# Patient Record
Sex: FEMALE | Race: WHITE | NOT HISPANIC OR LATINO | URBAN - METROPOLITAN AREA
[De-identification: names, ages, dates, MRNs, and addresses within clinical notes are randomized per-mention and may not be internally consistent; named-entity substitution may affect disease eponyms.]

---

## 2024-04-09 ENCOUNTER — EMERGENCY (EMERGENCY)
Facility: HOSPITAL | Age: 19
LOS: 1 days | Discharge: HOME | End: 2024-04-09
Attending: EMERGENCY MEDICINE | Admitting: EMERGENCY MEDICINE
Payer: COMMERCIAL

## 2024-04-09 VITALS
TEMPERATURE: 99 F | OXYGEN SATURATION: 99 % | SYSTOLIC BLOOD PRESSURE: 128 MMHG | HEIGHT: 67 IN | HEART RATE: 127 BPM | WEIGHT: 130.07 LBS | DIASTOLIC BLOOD PRESSURE: 84 MMHG | RESPIRATION RATE: 18 BRPM

## 2024-04-09 DIAGNOSIS — R00.0 TACHYCARDIA, UNSPECIFIED: ICD-10-CM

## 2024-04-09 DIAGNOSIS — R22.1 LOCALIZED SWELLING, MASS AND LUMP, NECK: ICD-10-CM

## 2024-04-09 DIAGNOSIS — B27.90 INFECTIOUS MONONUCLEOSIS, UNSPECIFIED WITHOUT COMPLICATION: ICD-10-CM

## 2024-04-09 DIAGNOSIS — Z91.013 ALLERGY TO SEAFOOD: ICD-10-CM

## 2024-04-09 DIAGNOSIS — F41.9 ANXIETY DISORDER, UNSPECIFIED: ICD-10-CM

## 2024-04-09 DIAGNOSIS — Z20.822 CONTACT WITH AND (SUSPECTED) EXPOSURE TO COVID-19: ICD-10-CM

## 2024-04-09 DIAGNOSIS — R59.9 ENLARGED LYMPH NODES, UNSPECIFIED: ICD-10-CM

## 2024-04-09 DIAGNOSIS — F32.A DEPRESSION, UNSPECIFIED: ICD-10-CM

## 2024-04-09 PROCEDURE — 99053 MED SERV 10PM-8AM 24 HR FAC: CPT

## 2024-04-09 PROCEDURE — 99285 EMERGENCY DEPT VISIT HI MDM: CPT

## 2024-04-09 NOTE — ED PROVIDER NOTE - CLINICAL SUMMARY MEDICAL DECISION MAKING FREE TEXT BOX
18 non-binary history of anxiety/depression  presents with 3d of bilateral neck "bumps" that are non-tender and not associated with any other symptoms of infection such as cough, sore throat, fevers/chills, recent travel, animals in the home. Currently not seeing a PMD. Smokes THC.    PE: A&Ox3, well appearing, NAD, NCAT, regular respiratory effort, moving all four extremities equally.   Bedside sono: right posterior cervical LN ~1cm    MDM: Patient with noticeable right sided neck lump that is painless in quality and not associated with other symptoms here for eval. US shows 1cm LN. Patient offered blood work and has accepted. Will also attempt to set up patient with PCP.

## 2024-04-09 NOTE — ED ADULT TRIAGE NOTE - CHIEF COMPLAINT QUOTE
walk in pt with complaints of lump to right neck and to left neck x 10 days. Pt reports they are painless and do not feel irritated. Denies pmhx.

## 2024-04-09 NOTE — ED PROVIDER NOTE - PATIENT PORTAL LINK FT
You can access the FollowMyHealth Patient Portal offered by [facility] by registering at the following website: [email or web address]. By joining [facility]’s FollowMyHealth portal, you will also be able to view your health information using other applications (apps) compatible with our system.

## 2024-04-09 NOTE — ED PROVIDER NOTE - CARE PLAN
1 Principal Discharge DX:	Lymphadenopathy   Principal Discharge DX:	Lymphadenopathy  Secondary Diagnosis:	Infectious mononucleosis

## 2024-04-09 NOTE — ED PROVIDER NOTE - PHYSICAL EXAMINATION
PE: A&Ox3, well appearing, NAD, NCAT, regular respiratory effort, moving all four extremities equally.   Bedside sono: right posterior cervical LN ~1cm PE: A&Ox3, well appearing, mildly anxious, NCAT, regular respiratory effort, moving all four extremities equally. R sided posterior lymphadenopathy without overlying ttp/erythema.  Bedside sono: right posterior cervical LN ~1cm

## 2024-04-09 NOTE — ED PROVIDER NOTE - OBJECTIVE STATEMENT
18 non-binary history of anxiety/depression  presents with 3d of bilateral neck "bumps" that are non-tender and not associated with any other symptoms of infection such as cough, sore throat, fevers/chills, recent travel, animals in the home. Currently not seeing a PMD. Smokes THC.

## 2024-04-09 NOTE — ED PROVIDER NOTE - NSFOLLOWUPINSTRUCTIONS_ED_ALL_ED_FT
You have been found to have infectious mononucleosis, a viral illness that can cause your symptoms. Please read all handouts provided to you upon discharge. Seek immediate medical care for any new/worsening signs or symptoms.     Infectious Mononucleosis    Infectious mononucleosis is an infection that is caused by a virus. This illness is often called "mono." It can spread from person to person (is contagious). Mono is usually not serious. It often goes away in 2–4 weeks without treatment. In rare cases, the illness can be bad and last longer.    What are the causes?  This condition is caused by the Nikita–Barr virus. This virus spreads through:•Contact with a sick person's saliva or other body fluids. This can happen through:  Kissing.  Having sex.  Coughing.  Sneezing.  Sharing forks, spoons, knives (utensils), or drinking glasses with a person who is sick.  Receiving blood from a person who has mono (blood transfusion).  Receiving an organ from a person who has mono (organ transplant).    What increases the risk?  You are more likely to develop this condition if:  You are 15–24 years old.    What are the signs or symptoms?  You may get symptoms 4–6 weeks after infection. Symptoms may start slowly and happen at different times. Common symptoms include:  Sore throat.   Headache.   Being very tired (fatigued).  Pain in the muscles.  Swollen glands.   Fever.   No desire for food.  Rash.    Other symptoms include:  A liver or spleen that is larger than normal.  Feeling sick to your stomach (nauseous).  Throwing up (vomiting).  Pain in the belly (abdomen).    How is this treated?  There is no cure for this condition. Mono usually goes away on its own with time. Treatment can help relieve symptoms and may include:  Taking medicines to treat pain and fever.  Drinking plenty of fluids.  Getting a lot of rest.  Taking medicines to treat swelling (corticosteroids).    In some very bad cases, treatment may have to be given in a hospital.    Follow these instructions at home:  Medicines   Take over-the-counter and prescription medicines only as told by your doctor.  Do not take ampicillin or amoxicillin. This may cause a rash.  Do not take aspirin if you are under 18.    Activity   Rest as needed.  Do not do any of the following activities until your doctor says that they are safe for you:  Contact sports. You may need to wait at least 1 month before you play sports.  Exercise that uses a lot of energy.  Lifting heavy things.  Slowly go back to your normal activities after your fever is gone, or when your doctor says that you can. Be sure to rest when you get tired.    General instructions   Avoid kissing or sharing forks, spoons, knives, or drinking cups until your doctor says that you can.  Drink enough fluid to keep your pee (urine) pale yellow.   Do not drink alcohol.    If you have a sore throat:  Rinse your mouth (gargle) with salt water 3–4 times a day or as needed. To make salt water, dissolve ½–1 tsp (3–6 g) of salt in 1 cup (237 mL) of warm water.  Eat soft foods. Cold foods such as ice cream or ice pops can help your throat feel better.  Try sucking on hard candy.  Wash your hands often with soap and water. If you cannot use soap and water, use hand .  Keep all follow-up visits as told by your doctor. This is important.      How is this prevented?   Avoid contact with people who have mono. A person who has mono may not seem sick, but he or she can still spread the virus.  Avoid sharing forks, spoons, knives, drinking cups, or toothbrushes.  Wash your hands often with soap and water. If you cannot use soap and water, use hand .  Use the inside of your elbow to cover your mouth when you cough or sneeze.    Contact a doctor if:  Your fever is not gone after 10 days.  You have swelling by your jaw or neck (swollen lymph nodes), and the swelling does not go away after 4 weeks.  Your activity level is not back to normal after 2 months.  Your skin or the white parts of your eyes turn yellow (jaundice).    You have trouble pooping (constipation). This may mean that you:  Poop (have a bowel movement) fewer times in a week than normal.  Have a hard time pooping.  Have poop that is dry, hard, or bigger than normal.      Get help right away if:  You have very bad pain in your:  Belly.  Shoulder.  You are drooling.  You have trouble swallowing.  You have trouble breathing.  You have a stiff neck.  You have a very bad headache.  You cannot stop throwing up.  You have jerky movements that you cannot control (seizures).  You are mixed up (confused).  You have trouble with balance.  Your nose or gums start to bleed.    You have signs of not having enough water in your body (dehydration). These may include:  Weakness.  Sunken eyes.  Pale skin.  Dry mouth.  Fast breathing or heartbeat.    Summary  Infectious mononucleosis, or "mono," is an infection that is caused by a virus.  Mono is usually not serious, but some people may need to be treated for it in the hospital.  You should not play contact sports or lift heavy things until your doctor says that you can.  Wash your hands often with soap and water. If you cannot use soap and water, use hand .      This information is not intended to replace advice given to you by your health care provider. Make sure you discuss any questions you have with your health care provider.

## 2024-04-10 VITALS
HEART RATE: 100 BPM | SYSTOLIC BLOOD PRESSURE: 128 MMHG | OXYGEN SATURATION: 99 % | DIASTOLIC BLOOD PRESSURE: 80 MMHG | RESPIRATION RATE: 18 BRPM

## 2024-04-10 LAB
ALBUMIN SERPL ELPH-MCNC: 3.3 G/DL — LOW (ref 3.4–5)
ALP SERPL-CCNC: 74 U/L — SIGNIFICANT CHANGE UP (ref 40–120)
ALT FLD-CCNC: 25 U/L — SIGNIFICANT CHANGE UP (ref 12–42)
ANION GAP SERPL CALC-SCNC: 10 MMOL/L — SIGNIFICANT CHANGE UP (ref 9–16)
AST SERPL-CCNC: 31 U/L — SIGNIFICANT CHANGE UP (ref 15–37)
BASOPHILS # BLD AUTO: 0.21 K/UL — HIGH (ref 0–0.2)
BASOPHILS NFR BLD AUTO: 2 % — SIGNIFICANT CHANGE UP (ref 0–2)
BILIRUB SERPL-MCNC: 0.2 MG/DL — SIGNIFICANT CHANGE UP (ref 0.2–1.2)
BUN SERPL-MCNC: 7 MG/DL — SIGNIFICANT CHANGE UP (ref 7–23)
CALCIUM SERPL-MCNC: 8.5 MG/DL — SIGNIFICANT CHANGE UP (ref 8.5–10.5)
CHLORIDE SERPL-SCNC: 105 MMOL/L — SIGNIFICANT CHANGE UP (ref 96–108)
CO2 SERPL-SCNC: 24 MMOL/L — SIGNIFICANT CHANGE UP (ref 22–31)
CREAT SERPL-MCNC: 0.91 MG/DL — SIGNIFICANT CHANGE UP (ref 0.5–1.3)
CRP SERPL-MCNC: 2.4 MG/L — SIGNIFICANT CHANGE UP (ref 0.5–3)
EGFR: 93 ML/MIN/1.73M2 — SIGNIFICANT CHANGE UP
EOSINOPHIL # BLD AUTO: 0 K/UL — SIGNIFICANT CHANGE UP (ref 0–0.5)
EOSINOPHIL NFR BLD AUTO: 0 % — SIGNIFICANT CHANGE UP (ref 0–6)
FLUAV AG NPH QL: SIGNIFICANT CHANGE UP
FLUBV AG NPH QL: SIGNIFICANT CHANGE UP
GIANT PLATELETS BLD QL SMEAR: PRESENT — SIGNIFICANT CHANGE UP
GLUCOSE SERPL-MCNC: 101 MG/DL — HIGH (ref 70–99)
HCG SERPL-ACNC: 1 MIU/ML — SIGNIFICANT CHANGE UP
HCT VFR BLD CALC: 34.8 % — SIGNIFICANT CHANGE UP (ref 34.5–45)
HETEROPH AB TITR SER AGGL: POSITIVE
HGB BLD-MCNC: 11.7 G/DL — SIGNIFICANT CHANGE UP (ref 11.5–15.5)
HIV 1 & 2 AB SERPL IA.RAPID: SIGNIFICANT CHANGE UP
LYMPHOCYTES # BLD AUTO: 1.95 K/UL — SIGNIFICANT CHANGE UP (ref 1–3.3)
LYMPHOCYTES # BLD AUTO: 19 % — SIGNIFICANT CHANGE UP (ref 13–44)
MANUAL SMEAR VERIFICATION: SIGNIFICANT CHANGE UP
MCHC RBC-ENTMCNC: 29 PG — SIGNIFICANT CHANGE UP (ref 27–34)
MCHC RBC-ENTMCNC: 33.6 GM/DL — SIGNIFICANT CHANGE UP (ref 32–36)
MCV RBC AUTO: 86.1 FL — SIGNIFICANT CHANGE UP (ref 80–100)
MONOCYTES # BLD AUTO: 0.41 K/UL — SIGNIFICANT CHANGE UP (ref 0–0.9)
MONOCYTES NFR BLD AUTO: 4 % — SIGNIFICANT CHANGE UP (ref 2–14)
NEUTROPHILS # BLD AUTO: 2.67 K/UL — SIGNIFICANT CHANGE UP (ref 1.8–7.4)
NEUTROPHILS NFR BLD AUTO: 26 % — LOW (ref 43–77)
NRBC # BLD: 0 /100 WBCS — SIGNIFICANT CHANGE UP (ref 0–0)
NRBC # BLD: SIGNIFICANT CHANGE UP /100 WBCS (ref 0–0)
PLAT MORPH BLD: NORMAL — SIGNIFICANT CHANGE UP
PLATELET # BLD AUTO: 252 K/UL — SIGNIFICANT CHANGE UP (ref 150–400)
POTASSIUM SERPL-MCNC: 3.6 MMOL/L — SIGNIFICANT CHANGE UP (ref 3.5–5.3)
POTASSIUM SERPL-SCNC: 3.6 MMOL/L — SIGNIFICANT CHANGE UP (ref 3.5–5.3)
PROT SERPL-MCNC: 7.6 G/DL — SIGNIFICANT CHANGE UP (ref 6.4–8.2)
RBC # BLD: 4.04 M/UL — SIGNIFICANT CHANGE UP (ref 3.8–5.2)
RBC # FLD: 13 % — SIGNIFICANT CHANGE UP (ref 10.3–14.5)
RBC BLD AUTO: NORMAL — SIGNIFICANT CHANGE UP
RSV RNA NPH QL NAA+NON-PROBE: SIGNIFICANT CHANGE UP
SARS-COV-2 RNA SPEC QL NAA+PROBE: SIGNIFICANT CHANGE UP
SODIUM SERPL-SCNC: 139 MMOL/L — SIGNIFICANT CHANGE UP (ref 132–145)
TOXIC GRANULES BLD QL SMEAR: PRESENT — SIGNIFICANT CHANGE UP
VARIANT LYMPHS # BLD: 49 % — HIGH (ref 0–6)
WBC # BLD: 10.25 K/UL — SIGNIFICANT CHANGE UP (ref 3.8–10.5)
WBC # FLD AUTO: 10.25 K/UL — SIGNIFICANT CHANGE UP (ref 3.8–10.5)

## 2024-04-10 NOTE — ED ADULT NURSE NOTE - NS_NURSE_DISC_TEACHING_YN_ED_ALL_ED
She is taking scheduled Midodrine but her blood pressure is high on admission.  Hold and follow blood pressures.  Recheck orthostatics in the morning.   Yes

## 2024-04-30 ENCOUNTER — NON-APPOINTMENT (OUTPATIENT)
Age: 19
End: 2024-04-30

## 2024-04-30 ENCOUNTER — APPOINTMENT (OUTPATIENT)
Dept: FAMILY MEDICINE | Facility: CLINIC | Age: 19
End: 2024-04-30
Payer: COMMERCIAL

## 2024-04-30 VITALS
HEIGHT: 66.5 IN | BODY MASS INDEX: 22.55 KG/M2 | OXYGEN SATURATION: 98 % | DIASTOLIC BLOOD PRESSURE: 80 MMHG | SYSTOLIC BLOOD PRESSURE: 116 MMHG | TEMPERATURE: 98.2 F | WEIGHT: 142 LBS | HEART RATE: 76 BPM

## 2024-04-30 DIAGNOSIS — Z84.89 FAMILY HISTORY OF OTHER SPECIFIED CONDITIONS: ICD-10-CM

## 2024-04-30 DIAGNOSIS — F41.8 OTHER SPECIFIED ANXIETY DISORDERS: ICD-10-CM

## 2024-04-30 DIAGNOSIS — Z78.9 OTHER SPECIFIED HEALTH STATUS: ICD-10-CM

## 2024-04-30 DIAGNOSIS — N89.8 OTHER SPECIFIED NONINFLAMMATORY DISORDERS OF VAGINA: ICD-10-CM

## 2024-04-30 DIAGNOSIS — J45.20 MILD INTERMITTENT ASTHMA, UNCOMPLICATED: ICD-10-CM

## 2024-04-30 DIAGNOSIS — Z00.00 ENCOUNTER FOR GENERAL ADULT MEDICAL EXAMINATION W/OUT ABNORMAL FINDINGS: ICD-10-CM

## 2024-04-30 DIAGNOSIS — Z82.3 FAMILY HISTORY OF STROKE: ICD-10-CM

## 2024-04-30 PROCEDURE — 99385 PREV VISIT NEW AGE 18-39: CPT | Mod: 25

## 2024-04-30 PROCEDURE — 36415 COLL VENOUS BLD VENIPUNCTURE: CPT

## 2024-04-30 PROCEDURE — 99203 OFFICE O/P NEW LOW 30 MIN: CPT

## 2024-04-30 RX ORDER — VENLAFAXINE 100 MG/1
100 TABLET ORAL
Qty: 90 | Refills: 1 | Status: ACTIVE | COMMUNITY
Start: 2024-04-30 | End: 1900-01-01

## 2024-04-30 RX ORDER — LEVONORGESTREL AND ETHINYL ESTRADIOL AND ETHINYL ESTRADIOL 150-30(84)
0.15-0.03 &0.01 KIT ORAL DAILY
Refills: 0 | Status: ACTIVE | COMMUNITY
Start: 2024-04-30

## 2024-04-30 RX ORDER — ALBUTEROL SULFATE 90 UG/1
108 (90 BASE) INHALANT RESPIRATORY (INHALATION)
Qty: 1 | Refills: 3 | Status: ACTIVE | COMMUNITY
Start: 2024-04-30

## 2024-04-30 RX ORDER — HYDROXYZINE HYDROCHLORIDE 25 MG/1
25 TABLET ORAL DAILY
Qty: 90 | Refills: 0 | Status: ACTIVE | COMMUNITY
Start: 2024-04-30

## 2024-04-30 NOTE — HEALTH RISK ASSESSMENT
[Yes] : Yes [2 - 4 times a month (2 pts)] : 2-4 times a month (2 points) [1 or 2 (0 pts)] : 1 or 2 (0 points) [Never (0 pts)] : Never (0 points) [No falls in past year] : Patient reported no falls in the past year [1] : 2) Feeling down, depressed, or hopeless for several days (1) [PHQ-2 Positive] : PHQ-2 Positive [I have developed a follow-up plan documented below in the note.] : I have developed a follow-up plan documented below in the note. [None] : None [Student] : student [Fully functional (bathing, dressing, toileting, transferring, walking, feeding)] : Fully functional (bathing, dressing, toileting, transferring, walking, feeding) [Fully functional (using the telephone, shopping, preparing meals, housekeeping, doing laundry, using] : Fully functional and needs no help or supervision to perform IADLs (using the telephone, shopping, preparing meals, housekeeping, doing laundry, using transportation, managing medications and managing finances) [Never] : Never [No] : In the past 12 months have you used drugs other than those required for medical reasons? 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

## 2024-04-30 NOTE — REVIEW OF SYSTEMS
[Negative] : Psychiatric [Vaginal Discharge] : vaginal discharge [Dysuria] : no dysuria [Hematuria] : no hematuria

## 2024-04-30 NOTE — PLAN
[FreeTextEntry1] : HCM -F/u bloodwork, call patient with results -Referral to gynecology provided for pap smear once patient turns 19, discussed risk of cervical cancer and screening guidelines, patient states she will make an appt -Declines STI screening

## 2024-04-30 NOTE — HISTORY OF PRESENT ILLNESS
There were no immediate complications during the procedure. [FreeTextEntry1] : Establish care, CPE [de-identified] : 19 yo female presents to establish care, CPE. Patient recently moved from [place] end of Feb for school, looking to establish care. Patient reports hx anxiety/depression and sports-induced asthma. Patient previously saw pscyhiatrist and therapist in [place] for anxiety/depression, in search for new ones. Patient currently on Venlafaxine 75mg, states it was working well initially but now c/o increased agitation, easily annoyed, overly emotional. Patient interested in increasing dose of Venlafaxine. Has tried Lexapro, Zoloft, Prozac in past with poor reaction. Also prescribed hydroxyzine 25mg PRN, states she has taken it 2-3 times this past month. For hx sports-induced asthma, patient uses albuterol before dance classes, symptoms controlled. Patient currently c/o vaginal discharge, onset a few weeks ago. Also reports foul odor. Denies dysuria, hematuria. Denies recent sexual activity. Patient otherwise feeling well today.

## 2024-05-01 DIAGNOSIS — B96.89 ACUTE VAGINITIS: ICD-10-CM

## 2024-05-01 DIAGNOSIS — N76.0 ACUTE VAGINITIS: ICD-10-CM

## 2024-05-01 LAB
ALBUMIN SERPL ELPH-MCNC: 4.1 G/DL
ALP BLD-CCNC: 63 U/L
ALT SERPL-CCNC: 22 U/L
ANION GAP SERPL CALC-SCNC: 12 MMOL/L
AST SERPL-CCNC: 21 U/L
BASOPHILS # BLD AUTO: 0.05 K/UL
BASOPHILS NFR BLD AUTO: 0.6 %
BILIRUB SERPL-MCNC: 0.5 MG/DL
BUN SERPL-MCNC: 8 MG/DL
CALCIUM SERPL-MCNC: 9 MG/DL
CANDIDA VAG CYTO: NOT DETECTED
CHLORIDE SERPL-SCNC: 106 MMOL/L
CO2 SERPL-SCNC: 21 MMOL/L
CREAT SERPL-MCNC: 0.72 MG/DL
EGFR: 123 ML/MIN/1.73M2
EOSINOPHIL # BLD AUTO: 0.06 K/UL
EOSINOPHIL NFR BLD AUTO: 0.7 %
G VAGINALIS+PREV SP MTYP VAG QL MICRO: DETECTED
GLUCOSE SERPL-MCNC: 104 MG/DL
HCT VFR BLD CALC: 37.7 %
HGB BLD-MCNC: 11.8 G/DL
IMM GRANULOCYTES NFR BLD AUTO: 0.1 %
LYMPHOCYTES # BLD AUTO: 3.15 K/UL
LYMPHOCYTES NFR BLD AUTO: 37.4 %
MAN DIFF?: NORMAL
MCHC RBC-ENTMCNC: 27.6 PG
MCHC RBC-ENTMCNC: 31.3 GM/DL
MCV RBC AUTO: 88.3 FL
MONOCYTES # BLD AUTO: 0.66 K/UL
MONOCYTES NFR BLD AUTO: 7.8 %
NEUTROPHILS # BLD AUTO: 4.5 K/UL
NEUTROPHILS NFR BLD AUTO: 53.4 %
PLATELET # BLD AUTO: 311 K/UL
POTASSIUM SERPL-SCNC: 4.3 MMOL/L
PROT SERPL-MCNC: 7.2 G/DL
RBC # BLD: 4.27 M/UL
RBC # FLD: 13.4 %
SODIUM SERPL-SCNC: 139 MMOL/L
T VAGINALIS VAG QL WET PREP: NOT DETECTED
TSH SERPL-ACNC: 0.75 UIU/ML
WBC # FLD AUTO: 8.43 K/UL

## 2024-05-01 RX ORDER — METRONIDAZOLE 500 MG/1
500 TABLET ORAL TWICE DAILY
Qty: 14 | Refills: 0 | Status: ACTIVE | COMMUNITY
Start: 2024-05-01 | End: 1900-01-01

## 2024-05-02 ENCOUNTER — APPOINTMENT (OUTPATIENT)
Dept: INTERNAL MEDICINE | Facility: CLINIC | Age: 19
End: 2024-05-02

## 2024-05-07 ENCOUNTER — TRANSCRIPTION ENCOUNTER (OUTPATIENT)
Age: 19
End: 2024-05-07

## 2024-05-20 ENCOUNTER — TRANSCRIPTION ENCOUNTER (OUTPATIENT)
Age: 19
End: 2024-05-20

## 2024-05-22 ENCOUNTER — TRANSCRIPTION ENCOUNTER (OUTPATIENT)
Age: 19
End: 2024-05-22

## 2024-10-04 ENCOUNTER — RX RENEWAL (OUTPATIENT)
Age: 19
End: 2024-10-04

## 2025-01-27 ENCOUNTER — RX RENEWAL (OUTPATIENT)
Age: 20
End: 2025-01-27